# Patient Record
Sex: FEMALE | Race: ASIAN | NOT HISPANIC OR LATINO | URBAN - METROPOLITAN AREA
[De-identification: names, ages, dates, MRNs, and addresses within clinical notes are randomized per-mention and may not be internally consistent; named-entity substitution may affect disease eponyms.]

---

## 2024-11-05 ENCOUNTER — HOSPITAL ENCOUNTER (OUTPATIENT)
Dept: RADIOLOGY | Facility: MEDICAL CENTER | Age: 34
End: 2024-11-05
Attending: FAMILY MEDICINE
Payer: COMMERCIAL

## 2024-11-05 ENCOUNTER — TELEPHONE (OUTPATIENT)
Dept: SURGERY | Facility: MEDICAL CENTER | Age: 34
End: 2024-11-05
Payer: COMMERCIAL

## 2024-11-05 NOTE — TELEPHONE ENCOUNTER
LVM for Aracely telling her we did not receive a referral for her. Left our fax # and told her to have her PCP fax over the referral to us so we can have all of her information. Left my direct line so she can call back with any questions.    Kj

## 2024-12-18 ENCOUNTER — PATIENT MESSAGE (OUTPATIENT)
Dept: SURGERY | Facility: MEDICAL CENTER | Age: 34
End: 2024-12-18

## 2024-12-18 ENCOUNTER — OFFICE VISIT (OUTPATIENT)
Dept: SURGERY | Facility: MEDICAL CENTER | Age: 34
End: 2024-12-18
Payer: COMMERCIAL

## 2024-12-18 VITALS
HEIGHT: 61 IN | TEMPERATURE: 97.2 F | DIASTOLIC BLOOD PRESSURE: 80 MMHG | WEIGHT: 125.2 LBS | OXYGEN SATURATION: 93 % | HEART RATE: 67 BPM | SYSTOLIC BLOOD PRESSURE: 118 MMHG | BODY MASS INDEX: 23.64 KG/M2

## 2024-12-18 DIAGNOSIS — N63.11 MASS OF UPPER OUTER QUADRANT OF RIGHT BREAST: ICD-10-CM

## 2024-12-18 DIAGNOSIS — Z84.89 FAMILY HISTORY OF NEOPLASM OF BREAST: ICD-10-CM

## 2024-12-18 PROCEDURE — 99204 OFFICE O/P NEW MOD 45 MIN: CPT | Performed by: SURGERY

## 2024-12-18 PROCEDURE — 3079F DIAST BP 80-89 MM HG: CPT | Performed by: SURGERY

## 2024-12-18 PROCEDURE — 3074F SYST BP LT 130 MM HG: CPT | Performed by: SURGERY

## 2024-12-18 RX ORDER — PRENATAL VIT NO.130/IRON/FOLIC 27MG-0.8MG
1 TABLET ORAL DAILY
COMMUNITY

## 2024-12-18 NOTE — PROGRESS NOTES
"    Subjective:   12/18/2024  6:28 AM    Imaging facility:  [facility]  Primary care provider:  Maxine [place] N.P. [place] [facility] [place]    Chief Complaint:   Chief Complaint   Patient presents with    New Patient     Rt. Breast mass       History of presenting illness:  This very pleasant 34 y.o. year old self-identified Maur[place] female is kindly referred for consultation regarding a painful RIGHT breast mass.  She has had this tender lump for almost 10 years, without change in size.  The pain is very focal, only at the mass itself, and occurs daily.  She recalls trying to get this evaluated at the time, but felt that she was gaslit by [place].  Two years ago she moved to [place] and arranged for a screening US that shows a finding on her LEFT breast.      Her mother had DCIS at age 45, and she also has two maternal aunts and a maternal cousin who had premenopausal breast cancer.  No genetic testing has been done.       She has two sons and is hoping to get pregnant next year so she can have a daughter.  She notes that she tends to keloid scars.       LABS:  No results found for: \"HBA1C\"     There is no problem list on file for this patient.      Past Surgical History:   Procedure Laterality Date    HERNIA REPAIR  1997       Allergies   Allergen Reactions    Sulfa Drugs Itching and Rash       Current Outpatient Medications   Medication Sig    Prenatal Vit-Fe Fumarate-FA (PRENATAL VITAMINS) 27-0.8 MG Tab Take 1 Tablet by mouth every day.       Social History     Tobacco Use    Smoking status: Never    Smokeless tobacco: Never   Vaping Use    Vaping status: Never Used   Substance Use Topics    Alcohol use: Yes     Alcohol/week: 0.6 - 1.2 oz     Types: 1 - 2 Standard drinks or equivalent per week    Drug use: Not Currently     Types: Marijuana     Comment: Last used 2019        Family and Occupational History     Socioeconomic History    Marital status:      Spouse name: Not on file    Number of children: Not on file    " "Years of education: Not on file    Highest education level: Not on file   Occupational History    Not on file       Family History   Problem Relation Age of Onset    Breast Cancer Mother 45    Prostate cancer Father 65    Kidney cancer Father 65    Breast Cancer Maternal Aunt 50    Breast Cancer Maternal Aunt 48       OB History    Para Term  AB Living   2 2 2 0 0 0   SAB IAB Ectopic Molar Multiple Live Births   0 0 0 0 0 0   Obstetric Comments   Age of first delivery: 26   Menarche: 10   LMP: 12/10/24   No HRT   No BC       Review of Systems   Constitutional:  Positive for fatigue.   Genitourinary:  Positive for dyspareunia.    All other systems reviewed and are negative.      IMAGING:  Bilateral diagnostic mammo 23 [facility]:  Heterogen dense.  No prior mammo.   RIGHT upper outer oval circumscribed mass 1.7cm.   Left without suspicious findings per radiologist.      Right breast US:   10:00, 4.5cm FN:  Oval, parallel circumscribed hypoechoic mass.  1.6 x 1.1 x 1.6cm.   No axillary LAD.   BI RADS 3.      Bilateral breast US 24:   RIGHT 10:00, 4.5cm FN:  Hypoechoic parallel circumscribed oval mass.  Stable at 1.6 x 1 x 1.6cm.   LEFT 10:00, 2.5cm FN:  3mm cyst.   LEFT upper outer finding previously noted on screening US  (outside imaging) represents a normal island of fat surrounded by dense tissue.  No suspicious findings.         Objective:   /80 (BP Location: Left arm, Patient Position: Sitting, BP Cuff Size: Large adult)   Pulse 67   Temp 36.2 °C (97.2 °F) (Temporal)   Ht 1.549 m (5' 1\")   Wt 56.8 kg (125 lb 3.2 oz)   SpO2 93%   BMI 23.66 kg/m²       Physical Exam  Constitutional:       Appearance: Normal appearance.   Cardiovascular:      Rate and Rhythm: Normal rate and regular rhythm.      Heart sounds: Normal heart sounds.   Pulmonary:      Effort: Pulmonary effort is normal.      Breath sounds: Normal breath sounds.   Skin:     Comments: See scanned breast diagram "   Neurological:      Mental Status: She is alert.   Psychiatric:         Mood and Affect: Mood normal.         Behavior: Behavior normal.       FUNCTIONAL ASSESSMENT    Patient responses to questions:    Have you ever had shoulder surgery or been treated for a shoulder injury that resulted in a loss of range of motion?  No     Are you unable to reach into an upper cabinet and retrieve a cup or plate?  No     Do you have any limitations in daily activities because of your shoulder?  No     Overhead raise test for shoulder flexion:  Normal Range:  150-180 degrees        Diagnosis:     1. Mass of upper outer quadrant of right breast        2. Family history of neoplasm of breast  Referral to Genetics          Medical Decision Making:  Today's Assessment / Status / Plan:     ASSESSMENT:  RIGHT upper outer breast mass.  Stable by imaging, but increasingly symptomatic.  Desires excision.     1.6cm on US.  Palpable on exam.      LEFT upper inner area of concern originally identified on outside screening breast US appears to be benign breast tissue on targeted US 24 [facility].  No suspicious findings.    Last bilateral mammogram 24 [facility]:  Heterogen dense.  No prior mammo.    FH:  Mother (DCIS 45), two maternal aunts (breast cancer, 50 and 48), maternal cousin (breast cancer, 29).  Desires referral to Genetics.      Menopausal/HRT status:  .  Age of first delivery: 26  Menarche: 10  LMP: 12/10/24  No HRT  No BC    LIFESTYLE:  No smoking/vaping history.  Alcohol 1-2 per week.  No MJ since 2019.  BMI 23.      ECOG 0= Fully active, able to carry on all pre-disease performance without restriction.     DISCUSSED:  I personally reviewed and independently interpreted her breast imaging including mammograms and US, and relevant outside records.  I explained that her workup shows findings that are likely benign.  Active surveillance with imaging and self breast monitoring are felt to be reasonable options compared to more  invasive procedures such as biopsies or surgery.  However, patient concerns should also be taken into account.  The mass is stable on imaging, but symptomatic.      We discussed the potential surgical risks including bleeding, infection, and visible scars and/or other aesthetic changes that may not be what she expects.  Sometimes we are able to utilize certain surgical approaches to minimize contour defects and improve overall cosmesis, such as mastopexy, which can be performed to improve surgical access to the area of interest with a more aesthetically appealing outcome.  However, I emphasized that this is not cosmetic surgery.  If only done on one side, this can lead to asymmetry. The appearance and location of the NAC can sometimes change.  Importantly, there is always potential risk to future breastfeeding ability.  She  both her sons for a total of four years.  We discussed that the goal is best overall cosmesis with hidden scars when possible, though sometimes the aesthetic outcome is less optimal.  If she prefers to maximize her ability to breast feed from the right, her other options is to have a more visible incision directly over the area of interest.  However, she reports a history of keloiding.      We discussed her significant family history and the role of comprehensive genetic testing focused on cancer related gene mutations, and I advised consultation with genetic counselor along with post-test counseling to ensure she has adequate understanding of her test results and/or screening options.  She may qualify for high risk screening.  This typically includes annual MRI in conjunction with annual mammograms, typically offset by 6 months, along with regular clinical breast exams.      After detailed discussion, she desires referral to Genetics and potentially high risk screening.  She will talk to her family and let us know if she decides to proceed with excisional biopsy of RIGHT breast mass,  with direct incision versus mastopexy.  We realized after her appointment that she is actually due for mammogram this month, which is already ordered through her PCP.  We are contacting her to ensure this is done.    She would then potentially start breast MRI for screening in June, unless she desires pregnancy first.  We might need to adjust her MRI for screening if so.      Total time spent today, including personal review and independent interpretation of available breast imaging, outside records, face to face time, chart documentation, and , was 50 minutes.     PRESCRIPTION FOR CONTROLLED SUBSTANCE:  This patient may develop post-procedure pain that could exceed the capabilities of non-opioid medications such as acetaminophen, ibuprofen, or naproxen.  This patient may also require an anxiolytic for pre-procedure anxiety.  There are no sufficient alternatives to this medication that are currently available.  The patient understands that the purpose of opioid medication is to improve initial post-procedure symptoms, then transition to alternative forms of treatment, and is not intended for long-term use.  The patient has been advised not to use the controlled substances with alcohol, marijuana products, or other illicit drugs.  I have reviewed available medical records from this patient’s other providers and have directed my staff to obtain pertinent medical records as we are made aware.  After performing my evaluation and risk assessment, I feel that the controlled substances to be prescribed are appropriate.  Drug utilization report is reviewed prior to written prescriptions.  The patient was given the opportunity to ask questions regarding prescribed controlled substances.        PLAN:  Proceed with mammogram 12/24 as already planned and ordered by PCP.  Pt to let us know if she desires excisional biopsy RIGHT breast mass, with direct incision versus with mastopexy.  She has a history of hypertrophic  scars/keloid.  Other option is ongoing FU RIGHT breast US in MAY, 2025.    Genetics referral  If high risk screening opted, annual MRI would start JUNE 2025, annual mammo resumed DECEMBER 2024.  Exams and follow upcould be handled here if she desires.

## 2024-12-20 ENCOUNTER — TELEPHONE (OUTPATIENT)
Dept: SURGERY | Facility: MEDICAL CENTER | Age: 34
End: 2024-12-20
Payer: COMMERCIAL

## 2024-12-20 ASSESSMENT — ENCOUNTER SYMPTOMS: FATIGUE: 1

## 2024-12-20 NOTE — TELEPHONE ENCOUNTER
I tried to call the patient to inform her to read her [employer] msg. However I am not able to leave a msg. Thus, I left a msg on on her spouse's phone to let his spouse know to check her [employer] msg.

## 2024-12-20 NOTE — TELEPHONE ENCOUNTER
Patient called to discuss that she was unable to read Dr James's mychart message regarding the sMMG. I informed the patient about the content of the message and she plans to reach out to her PCP to schedule.

## 2025-01-21 ENCOUNTER — NON-PROVIDER VISIT (OUTPATIENT)
Dept: GENETICS | Facility: MEDICAL CENTER | Age: 35
End: 2025-01-21
Payer: COMMERCIAL

## 2025-01-21 NOTE — PROGRESS NOTES
Aracely Pearson is a 34 y.o. female here for a non-provider visit for: Lab Draws  on 1/21/2025 at 12:32 PM    Procedure Performed: Venipuncture     Anatomical site: Left Antecubital Area (AC)    Equipment used: 25 butterfly    Labs drawn: [employer] (two lavender EDTA tubes)    Ordering Provider: [employer]    Aj By: Erika Black, Med Ass't